# Patient Record
Sex: FEMALE | Race: WHITE | NOT HISPANIC OR LATINO | URBAN - METROPOLITAN AREA
[De-identification: names, ages, dates, MRNs, and addresses within clinical notes are randomized per-mention and may not be internally consistent; named-entity substitution may affect disease eponyms.]

---

## 2017-06-23 ENCOUNTER — EMERGENCY (EMERGENCY)
Facility: HOSPITAL | Age: 32
LOS: 1 days | Discharge: HOME | End: 2017-06-23
Attending: EMERGENCY MEDICINE | Admitting: EMERGENCY MEDICINE
Payer: COMMERCIAL

## 2017-06-23 VITALS
OXYGEN SATURATION: 100 % | TEMPERATURE: 98 F | HEART RATE: 78 BPM | DIASTOLIC BLOOD PRESSURE: 74 MMHG | SYSTOLIC BLOOD PRESSURE: 120 MMHG | RESPIRATION RATE: 18 BRPM

## 2017-06-23 VITALS
WEIGHT: 126.99 LBS | SYSTOLIC BLOOD PRESSURE: 113 MMHG | OXYGEN SATURATION: 100 % | HEIGHT: 62 IN | DIASTOLIC BLOOD PRESSURE: 76 MMHG | HEART RATE: 77 BPM | TEMPERATURE: 98 F | RESPIRATION RATE: 18 BRPM

## 2017-06-23 DIAGNOSIS — R10.2 PELVIC AND PERINEAL PAIN: ICD-10-CM

## 2017-06-23 DIAGNOSIS — Z88.8 ALLERGY STATUS TO OTHER DRUGS, MEDICAMENTS AND BIOLOGICAL SUBSTANCES STATUS: ICD-10-CM

## 2017-06-23 LAB
ALBUMIN SERPL ELPH-MCNC: 4.1 G/DL — SIGNIFICANT CHANGE UP (ref 3.3–5)
ALP SERPL-CCNC: 43 U/L — SIGNIFICANT CHANGE UP (ref 40–120)
ALT FLD-CCNC: 11 U/L — SIGNIFICANT CHANGE UP (ref 10–45)
ANION GAP SERPL CALC-SCNC: 17 MMOL/L — SIGNIFICANT CHANGE UP (ref 5–17)
APPEARANCE UR: CLEAR — SIGNIFICANT CHANGE UP
AST SERPL-CCNC: 17 U/L — SIGNIFICANT CHANGE UP (ref 10–40)
BASOPHILS NFR BLD AUTO: 0.4 % — SIGNIFICANT CHANGE UP (ref 0–2)
BILIRUB SERPL-MCNC: 0.3 MG/DL — SIGNIFICANT CHANGE UP (ref 0.2–1.2)
BILIRUB UR-MCNC: NEGATIVE — SIGNIFICANT CHANGE UP
BUN SERPL-MCNC: 18 MG/DL — SIGNIFICANT CHANGE UP (ref 7–23)
CALCIUM SERPL-MCNC: 7.9 MG/DL — LOW (ref 8.4–10.5)
CHLORIDE SERPL-SCNC: 99 MMOL/L — SIGNIFICANT CHANGE UP (ref 96–108)
CO2 SERPL-SCNC: 22 MMOL/L — SIGNIFICANT CHANGE UP (ref 22–31)
COLOR SPEC: YELLOW — SIGNIFICANT CHANGE UP
CREAT SERPL-MCNC: 0.6 MG/DL — SIGNIFICANT CHANGE UP (ref 0.5–1.3)
DIFF PNL FLD: NEGATIVE — SIGNIFICANT CHANGE UP
EOSINOPHIL NFR BLD AUTO: 0.6 % — SIGNIFICANT CHANGE UP (ref 0–6)
GLUCOSE SERPL-MCNC: 88 MG/DL — SIGNIFICANT CHANGE UP (ref 70–99)
GLUCOSE UR QL: NEGATIVE — SIGNIFICANT CHANGE UP
HCT VFR BLD CALC: 36.6 % — SIGNIFICANT CHANGE UP (ref 34.5–45)
HGB BLD-MCNC: 12.5 G/DL — SIGNIFICANT CHANGE UP (ref 11.5–15.5)
KETONES UR-MCNC: NEGATIVE — SIGNIFICANT CHANGE UP
LEUKOCYTE ESTERASE UR-ACNC: NEGATIVE — SIGNIFICANT CHANGE UP
LYMPHOCYTES # BLD AUTO: 30.5 % — SIGNIFICANT CHANGE UP (ref 13–44)
MCHC RBC-ENTMCNC: 29.4 PG — SIGNIFICANT CHANGE UP (ref 27–34)
MCHC RBC-ENTMCNC: 34.2 G/DL — SIGNIFICANT CHANGE UP (ref 32–36)
MCV RBC AUTO: 86.1 FL — SIGNIFICANT CHANGE UP (ref 80–100)
MONOCYTES NFR BLD AUTO: 6.8 % — SIGNIFICANT CHANGE UP (ref 2–14)
NEUTROPHILS NFR BLD AUTO: 61.7 % — SIGNIFICANT CHANGE UP (ref 43–77)
NITRITE UR-MCNC: NEGATIVE — SIGNIFICANT CHANGE UP
PH UR: 6.5 — SIGNIFICANT CHANGE UP (ref 5–8)
PLATELET # BLD AUTO: 235 K/UL — SIGNIFICANT CHANGE UP (ref 150–400)
POTASSIUM SERPL-MCNC: 4.3 MMOL/L — SIGNIFICANT CHANGE UP (ref 3.5–5.3)
POTASSIUM SERPL-SCNC: 4.3 MMOL/L — SIGNIFICANT CHANGE UP (ref 3.5–5.3)
PROT SERPL-MCNC: 7.3 G/DL — SIGNIFICANT CHANGE UP (ref 6–8.3)
PROT UR-MCNC: NEGATIVE MG/DL — SIGNIFICANT CHANGE UP
RBC # BLD: 4.25 M/UL — SIGNIFICANT CHANGE UP (ref 3.8–5.2)
RBC # FLD: 13.8 % — SIGNIFICANT CHANGE UP (ref 10.3–16.9)
SODIUM SERPL-SCNC: 138 MMOL/L — SIGNIFICANT CHANGE UP (ref 135–145)
SP GR SPEC: 1.01 — SIGNIFICANT CHANGE UP (ref 1–1.03)
UROBILINOGEN FLD QL: 0.2 E.U./DL — SIGNIFICANT CHANGE UP
WBC # BLD: 5.2 K/UL — SIGNIFICANT CHANGE UP (ref 3.8–10.5)
WBC # FLD AUTO: 5.2 K/UL — SIGNIFICANT CHANGE UP (ref 3.8–10.5)

## 2017-06-23 PROCEDURE — 80053 COMPREHEN METABOLIC PANEL: CPT

## 2017-06-23 PROCEDURE — 76830 TRANSVAGINAL US NON-OB: CPT

## 2017-06-23 PROCEDURE — 36415 COLL VENOUS BLD VENIPUNCTURE: CPT

## 2017-06-23 PROCEDURE — 99284 EMERGENCY DEPT VISIT MOD MDM: CPT | Mod: 25

## 2017-06-23 PROCEDURE — 99285 EMERGENCY DEPT VISIT HI MDM: CPT

## 2017-06-23 PROCEDURE — 76830 TRANSVAGINAL US NON-OB: CPT | Mod: 26

## 2017-06-23 PROCEDURE — 76856 US EXAM PELVIC COMPLETE: CPT

## 2017-06-23 PROCEDURE — 85025 COMPLETE CBC W/AUTO DIFF WBC: CPT

## 2017-06-23 PROCEDURE — 81003 URINALYSIS AUTO W/O SCOPE: CPT

## 2017-06-23 PROCEDURE — 76856 US EXAM PELVIC COMPLETE: CPT | Mod: 26

## 2017-06-23 NOTE — ED PROVIDER NOTE - PROGRESS NOTE DETAILS
u/s shows moderate free fluid in pelvis, likely 2/2 ruptured cyst, pt comfortable, given results, stable for d/c to f/u with her GYN

## 2017-06-23 NOTE — ED PROVIDER NOTE - OBJECTIVE STATEMENT
33 y/o f with no significant pmh presents c/o intermittent sharp right side pelvic pain starting yesterday.  Pt denies fever, dysuria, n/v/d, vaginal discharge, all other ROS negative.

## 2017-06-23 NOTE — ED PROVIDER NOTE - ATTENDING CONTRIBUTION TO CARE
32 F intermittent sharp R sided pelvic pain x 2 days.  Mild adnexal ttp, no r/g.  VSS.  Labs u/s noted.  Pt feeling better with tx for likely ruptured cyst.  Do not suspect appy or ruptured appy given context.  Plan outpt gyn fu.

## 2017-06-23 NOTE — ED PROVIDER NOTE - MEDICAL DECISION MAKING DETAILS
31 y/o f right side pelvic pain x 1 day; vss, labs wnl, u/a negative, pelvic u/s pending results, pt declined pain medication in ED, will f/u u/s result and dispo.

## 2017-06-23 NOTE — ED ADULT TRIAGE NOTE - CHIEF COMPLAINT QUOTE
Patient c/o rt sided pelvic pain radiating to lower back with nausea  since yesterday . Denies any dysuria , vomiting , fever nor chills .

## 2017-06-23 NOTE — ED ADULT NURSE NOTE - PMH
<<----- Click to add NO pertinent Past Medical History No pertinent past medical history Thyroid cancer

## 2017-06-23 NOTE — ED PROVIDER NOTE - GENITOURINARY, MLM
jo Turner PA-S; externally no lesions, internally scant discharge, mucosa pink, moist, no cmt, cervical os closed, +mild right side adnexal tenderness, no masses

## 2017-06-24 LAB
C TRACH RRNA SPEC QL NAA+PROBE: SIGNIFICANT CHANGE UP
N GONORRHOEA RRNA SPEC QL NAA+PROBE: SIGNIFICANT CHANGE UP
SPECIMEN SOURCE: SIGNIFICANT CHANGE UP